# Patient Record
Sex: MALE | Race: WHITE | NOT HISPANIC OR LATINO | URBAN - METROPOLITAN AREA
[De-identification: names, ages, dates, MRNs, and addresses within clinical notes are randomized per-mention and may not be internally consistent; named-entity substitution may affect disease eponyms.]

---

## 2023-09-22 ENCOUNTER — OFFICE VISIT (OUTPATIENT)
Dept: URGENT CARE | Facility: CLINIC | Age: 48
End: 2023-09-22
Payer: COMMERCIAL

## 2023-09-22 VITALS
HEART RATE: 74 BPM | BODY MASS INDEX: 29.19 KG/M2 | SYSTOLIC BLOOD PRESSURE: 157 MMHG | HEIGHT: 69 IN | OXYGEN SATURATION: 98 % | RESPIRATION RATE: 18 BRPM | WEIGHT: 197.06 LBS | DIASTOLIC BLOOD PRESSURE: 91 MMHG | TEMPERATURE: 98 F

## 2023-09-22 DIAGNOSIS — R03.0 ELEVATED BLOOD PRESSURE READING: Primary | ICD-10-CM

## 2023-09-22 DIAGNOSIS — R42 LIGHT-HEADEDNESS: ICD-10-CM

## 2023-09-22 PROCEDURE — 99204 OFFICE O/P NEW MOD 45 MIN: CPT | Mod: S$GLB,,, | Performed by: NURSE PRACTITIONER

## 2023-09-22 PROCEDURE — 93005 EKG 12-LEAD: ICD-10-PCS | Mod: S$GLB,,, | Performed by: NURSE PRACTITIONER

## 2023-09-22 PROCEDURE — 93005 ELECTROCARDIOGRAM TRACING: CPT | Mod: S$GLB,,, | Performed by: NURSE PRACTITIONER

## 2023-09-22 PROCEDURE — 99204 PR OFFICE/OUTPT VISIT, NEW, LEVL IV, 45-59 MIN: ICD-10-PCS | Mod: S$GLB,,, | Performed by: NURSE PRACTITIONER

## 2023-09-22 PROCEDURE — 93010 ELECTROCARDIOGRAM REPORT: CPT | Mod: S$GLB,,, | Performed by: INTERNAL MEDICINE

## 2023-09-22 PROCEDURE — 93010 EKG 12-LEAD: ICD-10-PCS | Mod: S$GLB,,, | Performed by: INTERNAL MEDICINE

## 2023-09-22 NOTE — PROGRESS NOTES
"Subjective:      Patient ID: Maurice Sun is a 48 y.o. male.    Vitals:  height is 5' 9.06" (1.754 m) and weight is 89.4 kg (197 lb 1.5 oz). His oral temperature is 98.1 °F (36.7 °C). His blood pressure is 157/91 (abnormal) and his pulse is 74. His respiration is 18 and oxygen saturation is 98%.     Chief Complaint: Hypertension (Dizziness and lightheaded today. )    48 year old male presents for evaluation of elevated blood pressure reading following a work out today @ 1:30. Drank 2 cups of coffee @ 730 am, ate a protein bar @ 930 am, drank 16 oz bottle of water x 2 prior to workout. Worked out around 12 pm x 45 minutes.  Drank 2 bottles of water post workout. Felt light headed 30 minutes post work-out, checked his BP which was 170/95. BP has gradually gone down since initial reading. Past diagnosis of HTN managed with lifestyle modifications x 6 years. In town visiting from May, reports increased salt consumption with visit.    Hypertension  This is a new problem. The current episode started today. The problem is unchanged. Pertinent negatives include no chest pain or palpitations. Agents associated with hypertension include decongestants (mucinex). Past treatments include nothing. There are no compliance problems.        Constitution: Negative.   HENT: Negative.     Neck: neck negative.   Cardiovascular: Negative.  Negative for chest pain, palpitations and sob on exertion.   Eyes: Negative.    Respiratory: Negative.     Gastrointestinal: Negative.    Endocrine: negative.   Genitourinary: Negative.    Musculoskeletal: Negative.    Skin: Negative.    Allergic/Immunologic: Negative.    Neurological: Negative.    Hematologic/Lymphatic: Negative.    Psychiatric/Behavioral: Negative.        Objective:     Physical Exam   Constitutional: He is oriented to person, place, and time. He is cooperative.  Non-toxic appearance. He does not appear ill. No distress. normalawake  HENT:   Head: Normocephalic and atraumatic. "   Ears:   Right Ear: Hearing normal.   Left Ear: Hearing normal.   Eyes: Conjunctivae are normal. Pupils are equal, round, and reactive to light. Right eye exhibits no discharge. Left eye exhibits no discharge. No scleral icterus. Extraocular movement intact   Neck: Neck supple.   Cardiovascular: Regular rhythm and normal heart sounds.   Pulmonary/Chest: Effort normal and breath sounds normal. No stridor. No respiratory distress. He has no decreased breath sounds. He has no wheezes. He has no rhonchi. He has no rales. He exhibits no tenderness.   Abdominal: Normal appearance.   Musculoskeletal: Normal range of motion.         General: Normal range of motion.      Right lower leg: No edema.      Left lower leg: No edema.   Neurological: no focal deficit. He is alert and oriented to person, place, and time.   Skin: Skin is warm, dry and not diaphoretic.   Psychiatric: His behavior is normal. Mood, judgment and thought content normal.   Nursing note and vitals reviewed.    EKG: normal EKG, normal sinus rhythm, there are no previous tracings available for comparison.    Assessment:     1. Elevated blood pressure reading    2. Light-headedness        Plan:     Patient presents for evaluation of elevated blood pressure reading and light headedness post work out. Symptoms likely attributed to increased sodium consumption. Normal Exam. Decision to perform EKG to rule out cardiac event, (-) findings discussed. Plan is to hydrate, limit sodium consumption, and follow up as needed.       Elevated blood pressure reading  -     IN OFFICE EKG 12-LEAD (to [facility])  -     Cancel: Orthostatic vital signs    Light-headedness                Patient Instructions   Hydration/increase fluids  Limit sodium consumption  Signs and symptoms of worsening discussed  Follow up with worsening

## 2023-09-22 NOTE — PATIENT INSTRUCTIONS
Hydration/increase fluids  Limit sodium consumption  Signs and symptoms of worsening discussed  Follow up with worsening  
[Normal] : Psychiatric

## 2023-09-25 ENCOUNTER — TELEPHONE (OUTPATIENT)
Dept: URGENT CARE | Facility: CLINIC | Age: 48
End: 2023-09-25
Payer: COMMERCIAL